# Patient Record
Sex: FEMALE | Race: WHITE | Employment: PART TIME | URBAN - METROPOLITAN AREA
[De-identification: names, ages, dates, MRNs, and addresses within clinical notes are randomized per-mention and may not be internally consistent; named-entity substitution may affect disease eponyms.]

---

## 2019-08-28 ENCOUNTER — APPOINTMENT (OUTPATIENT)
Dept: GENERAL RADIOLOGY | Age: 24
End: 2019-08-28

## 2019-08-28 ENCOUNTER — HOSPITAL ENCOUNTER (EMERGENCY)
Age: 24
Discharge: HOME | End: 2019-08-28

## 2019-08-28 VITALS
WEIGHT: 126.54 LBS | TEMPERATURE: 97.5 F | HEART RATE: 84 BPM | RESPIRATION RATE: 17 BRPM | BODY MASS INDEX: 21.08 KG/M2 | OXYGEN SATURATION: 99 % | SYSTOLIC BLOOD PRESSURE: 119 MMHG | DIASTOLIC BLOOD PRESSURE: 78 MMHG | HEIGHT: 65 IN

## 2019-08-28 DIAGNOSIS — S39.012A BACK STRAIN, INITIAL ENCOUNTER: ICD-10-CM

## 2019-08-28 DIAGNOSIS — S16.1XXA ACUTE STRAIN OF NECK MUSCLE, INITIAL ENCOUNTER: ICD-10-CM
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
## 2019-08-28 LAB — HCG(URINE) PREGNANCY TEST: NEGATIVE

## 2019-08-28 PROCEDURE — 6370000000 HC RX 637 (ALT 250 FOR IP): Performed by: PHYSICIAN ASSISTANT

## 2019-08-28 PROCEDURE — 99284 EMERGENCY DEPT VISIT MOD MDM: CPT

## 2019-08-28 PROCEDURE — 72100 X-RAY EXAM L-S SPINE 2/3 VWS: CPT

## 2019-08-28 PROCEDURE — 72040 X-RAY EXAM NECK SPINE 2-3 VW: CPT

## 2019-08-28 PROCEDURE — 84703 CHORIONIC GONADOTROPIN ASSAY: CPT

## 2019-08-28 RX ORDER — IBUPROFEN 400 MG/1
800 TABLET ORAL ONCE
Status: COMPLETED | OUTPATIENT
Start: 2019-08-28 | End: 2019-08-28

## 2019-08-28 RX ORDER — ACETAMINOPHEN 325 MG/1
650 TABLET ORAL ONCE
Status: COMPLETED | OUTPATIENT
Start: 2019-08-28 | End: 2019-08-28

## 2019-08-28 RX ORDER — CYCLOBENZAPRINE HCL 10 MG
10 TABLET ORAL 3 TIMES DAILY PRN
Qty: 21 TABLET | Refills: 0 | Status: SHIPPED | OUTPATIENT
Start: 2019-08-28 | End: 2019-09-07

## 2019-08-28 RX ORDER — IBUPROFEN 800 MG/1
800 TABLET ORAL EVERY 8 HOURS PRN
Qty: 30 TABLET | Refills: 0 | Status: SHIPPED | OUTPATIENT
Start: 2019-08-28

## 2019-08-28 RX ADMIN — ACETAMINOPHEN 650 MG: 325 TABLET ORAL at 10:26

## 2019-08-28 RX ADMIN — IBUPROFEN 800 MG: 400 TABLET, FILM COATED ORAL at 10:26

## 2019-08-28 ASSESSMENT — PAIN DESCRIPTION - ORIENTATION
ORIENTATION: MID

## 2019-08-28 ASSESSMENT — PAIN DESCRIPTION - FREQUENCY
FREQUENCY: CONTINUOUS

## 2019-08-28 ASSESSMENT — ENCOUNTER SYMPTOMS
ABDOMINAL PAIN: 0
VOMITING: 0
DIARRHEA: 0
COUGH: 0
BACK PAIN: 1
EYE PAIN: 0
NAUSEA: 0
SHORTNESS OF BREATH: 0

## 2019-08-28 ASSESSMENT — PAIN DESCRIPTION - PROGRESSION
CLINICAL_PROGRESSION: NOT CHANGED
CLINICAL_PROGRESSION: GRADUALLY WORSENING
CLINICAL_PROGRESSION: GRADUALLY IMPROVING

## 2019-08-28 ASSESSMENT — PAIN DESCRIPTION - ONSET
ONSET: ON-GOING

## 2019-08-28 ASSESSMENT — PAIN SCALES - GENERAL
PAINLEVEL_OUTOF10: 7
PAINLEVEL_OUTOF10: 6

## 2019-08-28 ASSESSMENT — PAIN DESCRIPTION - LOCATION
LOCATION: BACK

## 2019-08-28 ASSESSMENT — PAIN - FUNCTIONAL ASSESSMENT
PAIN_FUNCTIONAL_ASSESSMENT: ACTIVITIES ARE NOT PREVENTED
PAIN_FUNCTIONAL_ASSESSMENT: ACTIVITIES ARE NOT PREVENTED
PAIN_FUNCTIONAL_ASSESSMENT: PREVENTS OR INTERFERES SOME ACTIVE ACTIVITIES AND ADLS

## 2019-08-28 ASSESSMENT — PAIN DESCRIPTION - PAIN TYPE
TYPE: ACUTE PAIN

## 2019-08-28 ASSESSMENT — PAIN DESCRIPTION - DIRECTION: RADIATING_TOWARDS: RIGHT SIDE

## 2019-08-28 ASSESSMENT — PAIN DESCRIPTION - DESCRIPTORS
DESCRIPTORS: SORE
DESCRIPTORS: ACHING
DESCRIPTORS: ACHING;TIGHTNESS

## 2019-08-28 NOTE — ED PROVIDER NOTES
and vomiting. Genitourinary: Negative for dysuria. Musculoskeletal: Positive for back pain and neck pain. Negative for neck stiffness. Skin: Negative for rash. Neurological: Negative for dizziness and headaches. Psychiatric/Behavioral: Negative for confusion. Positives and Pertinent negatives as per HPI. Except as noted above in the ROS, problem specific ROS was completed and is negative. Physical Exam:  Physical Exam   Constitutional: She is oriented to person, place, and time. She appears well-developed. No distress. HENT:   Head: Normocephalic and atraumatic. Eyes: Right eye exhibits no discharge. Left eye exhibits no discharge. Neck: Normal range of motion. Neck supple. Pulmonary/Chest: No stridor. No respiratory distress. Musculoskeletal: Normal range of motion. Cervical back: She exhibits tenderness and bony tenderness. She exhibits normal range of motion. Lumbar back: She exhibits tenderness and pain. She exhibits normal range of motion, no bony tenderness, no swelling, no edema, no deformity, no laceration and normal pulse. Back:    BUE: strength intact 5/5, sensation intact bilaterally throughout, full ROM throughout  BLE: L4/L5/S1 strength intact 5/5, patellar reflexes +2, sensation intact, dorsalis pedis pulses +2   Neurological: She is alert and oriented to person, place, and time. No gross facial drooping. Moves all 4 extremities spontaneously. Skin: Skin is warm and dry. She is not diaphoretic. No pallor. Psychiatric: She has a normal mood and affect. Her behavior is normal.   Nursing note and vitals reviewed.       MEDICAL DECISION MAKING    Vitals:    Vitals:    08/28/19 1005 08/28/19 1102   BP: 129/82 116/80   Pulse: 86 82   Resp: 16 16   Temp: 97.6 °F (36.4 °C)    TempSrc: Oral    SpO2: 99%    Weight:  126 lb 8.7 oz (57.4 kg)   Height:  5' 5\" (1.651 m)       LABS:  Labs Reviewed   PREGNANCY, URINE    Narrative:     Performed at:  [facility]) - to edit the dictations but occasionally words are mis-transcribed.)    Electronically signed, Albaro Julien,           Albaro Julien  08/28/19 1150

## 2022-03-07 ENCOUNTER — OFFICE VISIT (OUTPATIENT)
Dept: INTERNAL MEDICINE CLINIC | Age: 27
End: 2022-03-07
Payer: COMMERCIAL

## 2022-03-07 VITALS
WEIGHT: 139 LBS | SYSTOLIC BLOOD PRESSURE: 100 MMHG | OXYGEN SATURATION: 99 % | BODY MASS INDEX: 23.13 KG/M2 | DIASTOLIC BLOOD PRESSURE: 78 MMHG | TEMPERATURE: 97.9 F | HEART RATE: 96 BPM

## 2022-03-07 DIAGNOSIS — Z00.00 HEALTHCARE MAINTENANCE: ICD-10-CM

## 2022-03-07 DIAGNOSIS — F41.8 DEPRESSION WITH ANXIETY: ICD-10-CM

## 2022-03-07 DIAGNOSIS — B00.2 ORAL HERPES: ICD-10-CM

## 2022-03-07 DIAGNOSIS — Z76.89 ENCOUNTER TO ESTABLISH CARE: Primary | ICD-10-CM

## 2022-03-07 DIAGNOSIS — G43.019 INTRACTABLE MIGRAINE WITHOUT AURA AND WITHOUT STATUS MIGRAINOSUS: ICD-10-CM

## 2022-03-07 DIAGNOSIS — D22.9 NEVUS: ICD-10-CM

## 2022-03-07 DIAGNOSIS — Z87.898 HISTORY OF SYNCOPE: ICD-10-CM

## 2022-03-07 DIAGNOSIS — H92.01 RIGHT EAR PAIN: ICD-10-CM

## 2022-03-07 DIAGNOSIS — R23.8 CHANGE OF SKIN COLOR: ICD-10-CM

## 2022-03-07 LAB
A/G RATIO: 1.6 (ref 1.1–2.2)
ALBUMIN SERPL-MCNC: 4.4 G/DL (ref 3.4–5)
ALP BLD-CCNC: 82 U/L (ref 40–129)
ALT SERPL-CCNC: 15 U/L (ref 10–40)
ANION GAP SERPL CALCULATED.3IONS-SCNC: 14 MMOL/L (ref 3–16)
AST SERPL-CCNC: 17 U/L (ref 15–37)
BASOPHILS ABSOLUTE: 0 K/UL (ref 0–0.2)
BASOPHILS RELATIVE PERCENT: 0.7 %
BILIRUB SERPL-MCNC: 0.5 MG/DL (ref 0–1)
BUN BLDV-MCNC: 10 MG/DL (ref 7–20)
CALCIUM SERPL-MCNC: 9.3 MG/DL (ref 8.3–10.6)
CHLORIDE BLD-SCNC: 102 MMOL/L (ref 99–110)
CO2: 21 MMOL/L (ref 21–32)
CREAT SERPL-MCNC: 0.8 MG/DL (ref 0.6–1.1)
EOSINOPHILS ABSOLUTE: 0.4 K/UL (ref 0–0.6)
EOSINOPHILS RELATIVE PERCENT: 6.6 %
GFR AFRICAN AMERICAN: >60
GFR NON-AFRICAN AMERICAN: >60
GLUCOSE BLD-MCNC: 94 MG/DL (ref 70–99)
HCT VFR BLD CALC: 39.3 % (ref 36–48)
HEMOGLOBIN: 13.4 G/DL (ref 12–16)
LYMPHOCYTES ABSOLUTE: 2.2 K/UL (ref 1–5.1)
LYMPHOCYTES RELATIVE PERCENT: 33.4 %
MCH RBC QN AUTO: 32.7 PG (ref 26–34)
MCHC RBC AUTO-ENTMCNC: 34.2 G/DL (ref 31–36)
MCV RBC AUTO: 95.7 FL (ref 80–100)
MONOCYTES ABSOLUTE: 0.6 K/UL (ref 0–1.3)
MONOCYTES RELATIVE PERCENT: 9.7 %
NEUTROPHILS ABSOLUTE: 3.3 K/UL (ref 1.7–7.7)
NEUTROPHILS RELATIVE PERCENT: 49.6 %
PDW BLD-RTO: 13 % (ref 12.4–15.4)
PLATELET # BLD: 223 K/UL (ref 135–450)
PMV BLD AUTO: 8.9 FL (ref 5–10.5)
POTASSIUM SERPL-SCNC: 4.1 MMOL/L (ref 3.5–5.1)
RBC # BLD: 4.11 M/UL (ref 4–5.2)
SODIUM BLD-SCNC: 137 MMOL/L (ref 136–145)
TOTAL PROTEIN: 7.2 G/DL (ref 6.4–8.2)
TSH REFLEX: 0.97 UIU/ML (ref 0.27–4.2)
VITAMIN D 25-HYDROXY: 25.9 NG/ML
WBC # BLD: 6.6 K/UL (ref 4–11)

## 2022-03-07 PROCEDURE — 99204 OFFICE O/P NEW MOD 45 MIN: CPT | Performed by: NURSE PRACTITIONER

## 2022-03-07 RX ORDER — VALACYCLOVIR HYDROCHLORIDE 1 G/1
2000 TABLET, FILM COATED ORAL 2 TIMES DAILY
Qty: 30 TABLET | Refills: 0 | Status: SHIPPED | OUTPATIENT
Start: 2022-03-07 | End: 2022-06-23 | Stop reason: SDUPTHER

## 2022-03-07 RX ORDER — TRAZODONE HYDROCHLORIDE 50 MG/1
50 TABLET ORAL NIGHTLY
Qty: 90 TABLET | Refills: 1 | Status: SHIPPED | OUTPATIENT
Start: 2022-03-07 | End: 2022-09-16 | Stop reason: SDUPTHER

## 2022-03-07 RX ORDER — BUPROPION HYDROCHLORIDE 300 MG/1
300 TABLET ORAL DAILY
COMMUNITY
End: 2022-03-07 | Stop reason: SDUPTHER

## 2022-03-07 RX ORDER — VALACYCLOVIR HYDROCHLORIDE 1 G/1
1000 TABLET, FILM COATED ORAL PRN
COMMUNITY
End: 2022-03-07 | Stop reason: SDUPTHER

## 2022-03-07 RX ORDER — ESCITALOPRAM OXALATE 10 MG/1
10 TABLET ORAL DAILY
COMMUNITY
End: 2022-03-07 | Stop reason: SDUPTHER

## 2022-03-07 RX ORDER — TOPIRAMATE 25 MG/1
TABLET ORAL
COMMUNITY
Start: 2022-01-16 | End: 2022-03-07 | Stop reason: SDUPTHER

## 2022-03-07 RX ORDER — TOPIRAMATE 25 MG/1
25 TABLET ORAL DAILY
Qty: 90 TABLET | Refills: 1 | Status: SHIPPED | OUTPATIENT
Start: 2022-03-07 | End: 2022-09-16 | Stop reason: SDUPTHER

## 2022-03-07 RX ORDER — BUPROPION HYDROCHLORIDE 300 MG/1
300 TABLET ORAL DAILY
Qty: 90 TABLET | Refills: 1 | Status: SHIPPED | OUTPATIENT
Start: 2022-03-07 | End: 2022-09-16 | Stop reason: SDUPTHER

## 2022-03-07 RX ORDER — TRAZODONE HYDROCHLORIDE 50 MG/1
25 TABLET ORAL NIGHTLY
COMMUNITY
End: 2022-03-07 | Stop reason: SDUPTHER

## 2022-03-07 RX ORDER — ESCITALOPRAM OXALATE 10 MG/1
10 TABLET ORAL DAILY
Qty: 90 TABLET | Refills: 1 | Status: SHIPPED | OUTPATIENT
Start: 2022-03-07 | End: 2022-09-16 | Stop reason: SDUPTHER

## 2022-03-07 ASSESSMENT — ENCOUNTER SYMPTOMS
CONSTIPATION: 0
COUGH: 0
SHORTNESS OF BREATH: 0
DIARRHEA: 0
NAUSEA: 0
COLOR CHANGE: 1
GASTROINTESTINAL NEGATIVE: 1
VOMITING: 0

## 2022-03-07 ASSESSMENT — PATIENT HEALTH QUESTIONNAIRE - PHQ9
SUM OF ALL RESPONSES TO PHQ QUESTIONS 1-9: 0
SUM OF ALL RESPONSES TO PHQ QUESTIONS 1-9: 0
2. FEELING DOWN, DEPRESSED OR HOPELESS: 0
SUM OF ALL RESPONSES TO PHQ QUESTIONS 1-9: 0
SUM OF ALL RESPONSES TO PHQ QUESTIONS 1-9: 0
SUM OF ALL RESPONSES TO PHQ9 QUESTIONS 1 & 2: 0
1. LITTLE INTEREST OR PLEASURE IN DOING THINGS: 0

## 2022-03-07 NOTE — PROGRESS NOTES
Date: 3/7/2022                                               Subjective/Objective:     Chief Complaint   Patient presents with   [address]       HPI     Christos Room is a 22 yo female, visit today to establish care. Was receiving care at [facility], last seen about a year ago. Had sinus infection a few months ago, was having a lot of ear pain also. Was told at urgent care that her ears were really inflamed. Was treated with antibiotics and steroids. Since that time she has had intermittent pain in her right ear. No drainage. Pain seems to be worse when her allergies are worse. Uses Flonase as needed for head congestion. Takes OTC allergy medication daily. Has bruise on right buttocks. Has been there for one year - fluctuates in size but never resolves. Not tender. History of migraine headaches followed by neurology at [facility]. She has managed on Topamax daily. She reports that since starting Topamax her headaches have decreased in severity. She was in she was also having syncopal episodes prior to starting Topamax. Neurology and cardiology have felt that these are related to her migraines, she has not had any episode since starting Topamax. History of anxiety/depression managed on Wellbutrin, Lexapro and trazodone as needed nightly. She reports that she has been treated for about 2 to 3 years. She reports that she previously saw a therapist however on current medication regimen has been feeling well. She feels her mood has been doing well. She denies SI currently. Denies prior SI. Vaccinations: Katelin Flaquita per patient, COVID-has received two doses, HPV-unsure, Tdap-UTD  Gyn: BCM: Nexplanon (placed 2 years ago). 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    Exercise: goes to the gym 3 times per week - cardio and strength training                    Patient Active Problem List    Diagnosis Date Noted    Depression with anxiety 03/07/2022    Oral herpes 03/07/2022    Intractable migraine without aura and without status migrainosus 03/07/2022       Past Medical History:   Diagnosis Date    Anxiety     Depression     Headache     Oral herpes        Past Surgical History:   Procedure Laterality Date    WISDOM TOOTH EXTRACTION      age 14       Admission on 08/28/2019, Discharged on 08/28/2019   Component Date Value Ref Range Status    HCG(Urine) Pregnancy Test 08/28/2019 Negative  Detects HCG level >20 MIU/mL Final    Comment: Note:  Always repeat results in question with a serum  quantitative pregnancy test. A serum hCG is positive  2-5 days before the urine hCG test.         Family History   Problem Relation Age of Onset    Heart Disease Mother     Anxiety Disorder Mother     Depression Mother     Anxiety Disorder Father     Depression Father     Breast Cancer Maternal Grandmother     Kidney Cancer Maternal Grandfather        Current Outpatient Medications   Medication Sig Dispense Refill    fluticasone propionate (FLOVENT) 50 MCG/BLIST AEPB inhaler Inhale 1 puff into the lungs daily      etonogestrel (NEXPLANON) 68 MG implant 68 mg by Subdermal route once      valACYclovir (VALTREX) 1 g tablet Take 2 tablets by mouth 2 times daily 30 tablet 0    buPROPion (WELLBUTRIN XL) 300 MG extended release tablet Take 1 tablet by mouth daily 90 tablet 1    escitalopram (LEXAPRO) 10 MG tablet Take 1 tablet by mouth daily 90 tablet 1    topiramate (TOPAMAX) 25 MG tablet Take 1 tablet by mouth daily 90 tablet 1    traZODone (DESYREL) 50 MG tablet Take 1 tablet by mouth nightly 90 tablet 1    ibuprofen (ADVIL;MOTRIN) 800 MG tablet Take 1 tablet by mouth every 8 hours as needed for Pain 30 tablet 0     No current facility-administered medications for this visit. No Known Allergies    Review of Systems   Constitutional: Negative for chills and fever.    Respiratory: Negative for cough and shortness of breath. Cardiovascular: Negative for chest pain. Gastrointestinal: Negative. Negative for constipation, diarrhea, nausea and vomiting. Musculoskeletal: Negative for arthralgias and myalgias. Skin: Positive for color change. Left upper buttocks bruising   Neurological: Positive for headaches. Negative for dizziness and syncope. Psychiatric/Behavioral: Negative for dysphoric mood and sleep disturbance. The patient is not nervous/anxious. Vitals:  /78 (Site: Left Upper Arm, Position: Sitting, Cuff Size: Medium Adult)   Pulse 96   Temp 97.9 °F (36.6 °C) (Oral)   Wt 139 lb (63 kg)   SpO2 99%   BMI 23.13 kg/m²     Physical Exam  Vitals reviewed. Constitutional:       General: She is not in acute distress. Appearance: Normal appearance. HENT:      Head: Normocephalic and atraumatic. Right Ear: Tympanic membrane, ear canal and external ear normal.      Left Ear: Tympanic membrane, ear canal and external ear normal.   Cardiovascular:      Rate and Rhythm: Normal rate and regular rhythm. Heart sounds: Normal heart sounds. No murmur heard. Pulmonary:      Effort: Pulmonary effort is normal. No respiratory distress. Breath sounds: Normal breath sounds. No wheezing. Abdominal:      General: Bowel sounds are normal. There is no distension. Palpations: Abdomen is soft. Tenderness: There is no abdominal tenderness. Musculoskeletal:      Right lower leg: No edema. Left lower leg: No edema. Skin:     General: Skin is warm and dry. Comments: Right buttock hyperpigmented papule  Right neck nevi   Neurological:      General: No focal deficit present. Mental Status: She is alert and oriented to person, place, and time. Psychiatric:         Mood and Affect: Mood normal.         Behavior: Behavior normal.         Thought Content: Thought content normal.         Judgment: Judgment normal.         Assessment/Plan     1. Encounter to establish care    2. Depression with anxiety: stable  - buPROPion (WELLBUTRIN XL) 300 MG extended release tablet; Take 1 tablet by mouth daily  Dispense: 90 tablet; Refill: 1  - escitalopram (LEXAPRO) 10 MG tablet; Take 1 tablet by mouth daily  Dispense: 90 tablet; Refill: 1  - traZODone (DESYREL) 50 MG tablet; Take 1 tablet by mouth nightly  Dispense: 90 tablet; Refill: 1   -Continue current medications. Follow-up 6 months, sooner if needed. 3. Oral herpes  - valACYclovir (VALTREX) 1 g tablet; Take 2 tablets by mouth 2 times daily  Dispense: 30 tablet; Refill: 0   -Episodic treatment. Refill prescription. 4. Intractable migraine without aura and without status migrainosus: stable  - topiramate (TOPAMAX) 25 MG tablet; Take 1 tablet by mouth daily  Dispense: 90 tablet; Refill: 1   -Had change of insurance and is working on following up with neurology. 5. History of syncope: Related to migraines. Has not had any episode since starting Topamax. Had change of insurance and is working on following up with neurology. 6. Right ear pain: Exam unremarkable. Symptoms do coincide with increase in her allergic rhinitis symptoms.  - Mojgan Gomez MD, Otolaryngology, [facility]   -Patient advised to use Flonase daily rather than as needed. Continue with OTC antihistamine. Follow-up with ENT if symptoms do not resolve. 7. Nevus  - External Referral To Dermatology    8. Change of skin color  - External Referral To Dermatology    9. Healthcare maintenance  -She is unsure if she has received HPV vaccination, will check  -Continue regular exercise  - Comprehensive Metabolic Panel  - CBC with Auto Differential  - TSH with Reflex; Future  - Vitamin D 25 Hydroxy;  Future  - [facility] - Kirstin Sanchez MD, Gynecology, [facility]      Orders Placed This Encounter   Procedures    Comprehensive Metabolic Panel    CBC with Auto Differential    TSH with Reflex     Standing Status: Future     Number of Occurrences:   1     Standing Expiration Date:   3/7/2023    Vitamin D 25 Hydroxy     Standing Status:   Future     Number of Occurrences:   1     Standing Expiration Date:   3/7/2023    NINO Pierre MD, Gynecology, [facility]     Referral Priority:   Routine     Referral Type:   Eval and Treat     Referral Reason:   Specialty Services Required     Referred to Provider:   Denisse Rose MD     Requested Specialty:   Obstetrics & Gynecology     Number of Visits Requested:   Martin Hsieh MD, Otolaryngology, [facility]     Referral Priority:   Routine     Referral Type:   Eval and Treat     Referral Reason:   Specialty Services Required     Referred to Provider:   Dino Garcia MD     Requested Specialty:   Otolaryngology     Number of Visits Requested:   1    External Referral To Dermatology     Referral Priority:   Routine     Referral Type:   Eval and Treat     Referral Reason:   Specialty Services Required     Requested Specialty:   Dermatology     Number of Visits Requested:   1       Return in about 6 months (around 9/7/2022) for anxiety/depression. OR sooner with questions, concerns, worsening symptoms    KAYA KEYS, NP    3/7/2022  2:45 PM    Discussed use, benefit, and side effects of prescribed medications. Barriers to medication compliance addressed. Discussed all ordered testing and labs. All patient questions answered. Patient agreeable with plan above. Please note that this chart was generated using dragon dictation software. Although every effort was made to ensure the accuracy of this automated transcription, some errors in transcription may have occurred.

## 2022-06-23 DIAGNOSIS — B00.2 ORAL HERPES: ICD-10-CM

## 2022-06-24 RX ORDER — VALACYCLOVIR HYDROCHLORIDE 1 G/1
2000 TABLET, FILM COATED ORAL 2 TIMES DAILY
Qty: 30 TABLET | Refills: 0 | Status: SHIPPED | OUTPATIENT
Start: 2022-06-24

## 2022-09-16 ENCOUNTER — OFFICE VISIT (OUTPATIENT)
Dept: INTERNAL MEDICINE CLINIC | Age: 27
End: 2022-09-16
Payer: COMMERCIAL

## 2022-09-16 VITALS
DIASTOLIC BLOOD PRESSURE: 76 MMHG | OXYGEN SATURATION: 100 % | SYSTOLIC BLOOD PRESSURE: 110 MMHG | HEART RATE: 86 BPM | TEMPERATURE: 98.5 F | WEIGHT: 151 LBS | BODY MASS INDEX: 25.13 KG/M2

## 2022-09-16 DIAGNOSIS — F41.8 DEPRESSION WITH ANXIETY: Primary | ICD-10-CM

## 2022-09-16 DIAGNOSIS — R15.2 FECAL URGENCY: ICD-10-CM

## 2022-09-16 DIAGNOSIS — R53.83 FATIGUE, UNSPECIFIED TYPE: ICD-10-CM

## 2022-09-16 DIAGNOSIS — G43.019 INTRACTABLE MIGRAINE WITHOUT AURA AND WITHOUT STATUS MIGRAINOSUS: ICD-10-CM

## 2022-09-16 DIAGNOSIS — K92.1 BLOOD IN STOOL: ICD-10-CM

## 2022-09-16 PROCEDURE — 36415 COLL VENOUS BLD VENIPUNCTURE: CPT | Performed by: NURSE PRACTITIONER

## 2022-09-16 PROCEDURE — 99214 OFFICE O/P EST MOD 30 MIN: CPT | Performed by: NURSE PRACTITIONER

## 2022-09-16 RX ORDER — TRAZODONE HYDROCHLORIDE 50 MG/1
50 TABLET ORAL NIGHTLY
Qty: 90 TABLET | Refills: 1 | Status: SHIPPED | OUTPATIENT
Start: 2022-09-16

## 2022-09-16 RX ORDER — TOPIRAMATE 25 MG/1
25 TABLET ORAL DAILY
Qty: 90 TABLET | Refills: 1 | Status: SHIPPED | OUTPATIENT
Start: 2022-09-16

## 2022-09-16 RX ORDER — ESCITALOPRAM OXALATE 10 MG/1
10 TABLET ORAL DAILY
Qty: 90 TABLET | Refills: 1 | Status: SHIPPED | OUTPATIENT
Start: 2022-09-16

## 2022-09-16 RX ORDER — BUPROPION HYDROCHLORIDE 300 MG/1
300 TABLET ORAL DAILY
Qty: 90 TABLET | Refills: 1 | Status: SHIPPED | OUTPATIENT
Start: 2022-09-16

## 2022-09-16 ASSESSMENT — ENCOUNTER SYMPTOMS
NAUSEA: 0
BLOOD IN STOOL: 1
VOMITING: 1
COUGH: 0
DIARRHEA: 1
ABDOMINAL PAIN: 1
SHORTNESS OF BREATH: 0

## 2022-09-16 NOTE — PROGRESS NOTES
Date: 9/16/2022                                               Subjective/Objective:     Chief Complaint   Patient presents with    Depression     6 month follow up    Abdominal Pain     Has been having right sided abdominal pain, bowel issues/ urgency. Has some left sided abdominal pain as well       HPI    Nicole Harris is a 22 yo female, visit today for follow up on depression and anxiety. Last seen 3/7/2022. Recently moved to [place], [place]. She complains of urgency with bowel movements that began several months ago. This would occur about once per week and she would have loose stools associated with this. Over the last month this is increased to happening several times per week. She describes this as feeling abdominal pressure, having urgency like if she does not get to the bathroom right away she will make it and having associated loose stools. Having a bowel movement does not always completely relieve the abdominal pressure. She has had some intermittent abdominal bloating not associated with these episodes. This morning the abdominal pressure so severe she vomited, this is never happened before. She describes a clear liquid emesis. She does note that she had 2 episodes of blood on the toilet paper and in the stool. She denies fever/chills. She feels like she has been eating healthier lately. She denies any fecal incontinence. She is not having bowel movements outside of when she has these episodes. She reports that previously she folic she had always been constipated, was having a bowel movement once weekly that was formed, this was normal for her. Reports her brother and father have both had GI issues however she is unsure what. Over last several months has had fatigue. Starts about two hours after she wakes up she starts feeling tired and continues during the day. Had previously been started on wellbutrin due to feeling fatigued after taking trazodone and this had improved.  She feels like she is sleeping well at night. History of migraine headaches followed by neurology at [facility]. She has managed on Topamax daily. She reports that since starting Topamax her headaches have decreased in severity. She was in she was also having syncopal episodes prior to starting Topamax. Neurology and cardiology have felt that these are related to her migraines, she has not had any episode since starting Topamax. History of anxiety/depression managed on Wellbutrin, Lexapro and trazodone as needed nightly. Feels this medications have been very effective. Denies SI. Denies medication side effects. 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 - cardio and strength training            Patient Active Problem List    Diagnosis Date Noted    Depression with anxiety 03/07/2022    Oral herpes 03/07/2022    Intractable migraine without aura and without status migrainosus 03/07/2022       Past Medical History:   Diagnosis Date    Anxiety     Depression     Headache     Oral herpes        Past Surgical History:   Procedure Laterality Date    WISDOM TOOTH EXTRACTION      age 14       Office Visit on 03/07/2022   Component Date Value Ref Range Status    Sodium 03/07/2022 137  136 - 145 mmol/L Final    Potassium 03/07/2022 4.1  3.5 - 5.1 mmol/L Final    Chloride 03/07/2022 102  99 - 110 mmol/L Final    CO2 03/07/2022 21  21 - 32 mmol/L Final    Anion Gap 03/07/2022 14  3 - 16 Final    Glucose 03/07/2022 94  70 - 99 mg/dL Final    BUN 03/07/2022 10  7 - 20 mg/dL Final    Creatinine 03/07/2022 0.8  0.6 - 1.1 mg/dL Final    GFR Non- 03/07/2022 >60  >60 Final    Comment: >60 mL/min/1.73m2 EGFR, calc. for ages 25 and older using the  MDRD formula (not corrected for weight), is valid for stable  renal function.       GFR  03/07/2022 >60  >60 Final    Comment: Chronic Kidney Disease: less than 60 ml/min/1.73 sq.m. Kidney Failure: less than 15 ml/min/1.73 sq.m. Results valid for patients 18 years and older. Calcium 03/07/2022 9.3  8.3 - 10.6 mg/dL Final    Total Protein 03/07/2022 7.2  6.4 - 8.2 g/dL Final    Albumin 03/07/2022 4.4  3.4 - 5.0 g/dL Final    Albumin/Globulin Ratio 03/07/2022 1.6  1.1 - 2.2 Final    Total Bilirubin 03/07/2022 0.5  0.0 - 1.0 mg/dL Final    Alkaline Phosphatase 03/07/2022 82  40 - 129 U/L Final    ALT 03/07/2022 15  10 - 40 U/L Final    AST 03/07/2022 17  15 - 37 U/L Final    WBC 03/07/2022 6.6  4.0 - 11.0 K/uL Final    RBC 03/07/2022 4.11  4.00 - 5.20 M/uL Final    Hemoglobin 03/07/2022 13.4  12.0 - 16.0 g/dL Final    Hematocrit 03/07/2022 39.3  36.0 - 48.0 % Final    MCV 03/07/2022 95.7  80.0 - 100.0 fL Final    MCH 03/07/2022 32.7  26.0 - 34.0 pg Final    MCHC 03/07/2022 34.2  31.0 - 36.0 g/dL Final    RDW 03/07/2022 13.0  12.4 - 15.4 % Final    Platelets 84/74/8750 223  135 - 450 K/uL Final    MPV 03/07/2022 8.9  5.0 - 10.5 fL Final    Neutrophils % 03/07/2022 49.6  % Final    Lymphocytes % 03/07/2022 33.4  % Final    Monocytes % 03/07/2022 9.7  % Final    Eosinophils % 03/07/2022 6.6  % Final    Basophils % 03/07/2022 0.7  % Final    Neutrophils Absolute 03/07/2022 3.3  1.7 - 7.7 K/uL Final    Lymphocytes Absolute 03/07/2022 2.2  1.0 - 5.1 K/uL Final    Monocytes Absolute 03/07/2022 0.6  0.0 - 1.3 K/uL Final    Eosinophils Absolute 03/07/2022 0.4  0.0 - 0.6 K/uL Final    Basophils Absolute 03/07/2022 0.0  0.0 - 0.2 K/uL Final    TSH 03/07/2022 0.97  0.27 - 4.20 uIU/mL Final    Vit D, 25-Hydroxy 03/07/2022 25.9 (A) >=30 ng/mL Final    Comment: <=20 ng/mL. ........... Sugey Raddle Deficient  21-29 ng/mL. ......... Sugey Raddle Insufficient  >=30 ng/mL. ........ Sugey Raddle Sufficient         Family History   Problem Relation Age of Onset    Heart Disease Mother     Anxiety Disorder Mother     Depression Mother     Anxiety Disorder Father Depression Father     Breast Cancer Maternal Grandmother     Kidney Cancer Maternal Grandfather        Current Outpatient Medications   Medication Sig Dispense Refill    Fluticasone Propionate (FLONASE NA) by Nasal route      buPROPion (WELLBUTRIN XL) 300 MG extended release tablet Take 1 tablet by mouth daily 90 tablet 1    escitalopram (LEXAPRO) 10 MG tablet Take 1 tablet by mouth daily 90 tablet 1    topiramate (TOPAMAX) 25 MG tablet Take 1 tablet by mouth daily 90 tablet 1    traZODone (DESYREL) 50 MG tablet Take 1 tablet by mouth nightly 90 tablet 1    valACYclovir (VALTREX) 1 g tablet Take 2 tablets by mouth 2 times daily 30 tablet 0    etonogestrel (NEXPLANON) 68 MG implant 68 mg by Subdermal route once      ibuprofen (ADVIL;MOTRIN) 800 MG tablet Take 1 tablet by mouth every 8 hours as needed for Pain 30 tablet 0     No current facility-administered medications for this visit. No Known Allergies    Review of Systems   Constitutional:  Negative for appetite change, chills, fever and unexpected weight change. Respiratory:  Negative for cough and shortness of breath. Gastrointestinal:  Positive for abdominal pain, blood in stool, diarrhea and vomiting. Negative for nausea. Genitourinary:  Negative for difficulty urinating. Psychiatric/Behavioral:  Negative for dysphoric mood and sleep disturbance. The patient is not nervous/anxious. Vitals:  /76 (Site: Left Upper Arm, Position: Sitting, Cuff Size: Medium Adult)   Pulse 86   Temp 98.5 °F (36.9 °C) (Oral)   Wt 151 lb (68.5 kg)   SpO2 100%   BMI 25.13 kg/m²     Physical Exam  Vitals reviewed. Constitutional:       General: She is not in acute distress. HENT:      Head: Normocephalic and atraumatic. Cardiovascular:      Rate and Rhythm: Normal rate and regular rhythm. Heart sounds: Normal heart sounds. Pulmonary:      Effort: Pulmonary effort is normal. No respiratory distress. Breath sounds: Normal breath sounds. No wheezing. Abdominal:      General: Bowel sounds are normal. There is no distension. Palpations: Abdomen is soft. There is no mass. Tenderness: There is no abdominal tenderness. There is no guarding. Skin:     General: Skin is warm and dry. Neurological:      General: No focal deficit present. Mental Status: She is alert and oriented to person, place, and time. Psychiatric:         Mood and Affect: Mood normal.         Behavior: Behavior normal.         Thought Content: Thought content normal.         Judgment: Judgment normal.       Assessment/Plan     1. Depression with anxiety: stable  - buPROPion (WELLBUTRIN XL) 300 MG extended release tablet; Take 1 tablet by mouth daily  Dispense: 90 tablet; Refill: 1  - escitalopram (LEXAPRO) 10 MG tablet; Take 1 tablet by mouth daily  Dispense: 90 tablet; Refill: 1  - traZODone (DESYREL) 50 MG tablet; Take 1 tablet by mouth nightly  Dispense: 90 tablet; Refill: 1   -Continue with current management Lexapro, Wellbutrin and trazodone   -Follow-up 6 months, sooner if need    2. Fecal urgency: With associated abdominal pressure. Abdominal exam unremarkable. - Amb External Referral To Gastroenterology  - CT ABDOMEN PELVIS WO CONTRAST Additional Contrast? Radiologist Recommendation; Future   -Obtain CT and refer to GI. She wishes to see GI closer to home, external referral placed. 3. Fatigue, unspecified type: Had previously responded to Wellbutrin however fatigue has been worsening over last several months. We will check labs as below.  - TSH with Reflex; Future  - Vitamin B12; Future  - Basic Metabolic Panel; Future  - CBC with Auto Differential; Future    4. Blood in stool: Suspect hemorrhoidal.  Refer to GI  - Amb External Referral To Gastroenterology    5. Intractable migraine without aura and without status migrainosus: Stable. - topiramate (TOPAMAX) 25 MG tablet; Take 1 tablet by mouth daily  Dispense: 90 tablet;  Refill: 1   -Continue current management with Topamax    Orders Placed This Encounter   Procedures    CT ABDOMEN PELVIS WO CONTRAST Additional Contrast? Radiologist Recommendation     Standing Status:   Future     Standing Expiration Date:   9/16/2023     Order Specific Question:   Additional Contrast?     Answer:   Radiologist Recommendation     Order Specific Question:   Reason for exam:     Answer:   fecal urgency with abdominal pain    TSH with Reflex     Standing Status:   Future     Number of Occurrences:   1     Standing Expiration Date:   9/16/2023    Vitamin B12     Standing Status:   Future     Number of Occurrences:   1     Standing Expiration Date:   5/30/1169    Basic Metabolic Panel     Standing Status:   Future     Number of Occurrences:   1     Standing Expiration Date:   9/16/2023    CBC with Auto Differential     Standing Status:   Future     Number of Occurrences:   1     Standing Expiration Date:   9/16/2023    Amb External Referral To Gastroenterology     Referral Priority:   Routine     Referral Type:   Eval and Treat     Referral Reason:   Specialty Services Required     Requested Specialty:   Gastroenterology     Number of Visits Requested:   1       Return in about 6 months (around 3/16/2023). OR sooner with questions, concerns, worsening symptoms    KAYA KEYS  9/16/2022  3:43 PM    Discussed use, benefit, and side effects of prescribed medications. Barriers to medication compliance addressed. Discussed all ordered testing and labs. All patient questions answered. Patient agreeable with plan above. Please note that this chart was generated using dragon dictation software. Although every effort was made to ensure the accuracy of this automated transcription, some errors in transcription may have occurred.

## 2022-09-17 LAB
ANION GAP SERPL CALCULATED.3IONS-SCNC: 9 MMOL/L (ref 3–16)
BASOPHILS ABSOLUTE: 0.1 K/UL (ref 0–0.2)
BASOPHILS RELATIVE PERCENT: 0.9 %
BUN BLDV-MCNC: 12 MG/DL (ref 7–20)
CALCIUM SERPL-MCNC: 9.1 MG/DL (ref 8.3–10.6)
CHLORIDE BLD-SCNC: 105 MMOL/L (ref 99–110)
CO2: 26 MMOL/L (ref 21–32)
CREAT SERPL-MCNC: 0.7 MG/DL (ref 0.6–1.1)
EOSINOPHILS ABSOLUTE: 0.1 K/UL (ref 0–0.6)
EOSINOPHILS RELATIVE PERCENT: 2.1 %
GFR AFRICAN AMERICAN: >60
GFR NON-AFRICAN AMERICAN: >60
GLUCOSE BLD-MCNC: 90 MG/DL (ref 70–99)
HCT VFR BLD CALC: 37.7 % (ref 36–48)
HEMOGLOBIN: 12.9 G/DL (ref 12–16)
LYMPHOCYTES ABSOLUTE: 2.3 K/UL (ref 1–5.1)
LYMPHOCYTES RELATIVE PERCENT: 34.3 %
MCH RBC QN AUTO: 32.4 PG (ref 26–34)
MCHC RBC AUTO-ENTMCNC: 34.2 G/DL (ref 31–36)
MCV RBC AUTO: 94.8 FL (ref 80–100)
MONOCYTES ABSOLUTE: 0.6 K/UL (ref 0–1.3)
MONOCYTES RELATIVE PERCENT: 9.7 %
NEUTROPHILS ABSOLUTE: 3.5 K/UL (ref 1.7–7.7)
NEUTROPHILS RELATIVE PERCENT: 53 %
PDW BLD-RTO: 12.6 % (ref 12.4–15.4)
PLATELET # BLD: 251 K/UL (ref 135–450)
PMV BLD AUTO: 9.1 FL (ref 5–10.5)
POTASSIUM SERPL-SCNC: 4 MMOL/L (ref 3.5–5.1)
RBC # BLD: 3.98 M/UL (ref 4–5.2)
SODIUM BLD-SCNC: 140 MMOL/L (ref 136–145)
TSH REFLEX: 0.52 UIU/ML (ref 0.27–4.2)
VITAMIN B-12: 477 PG/ML (ref 211–911)
WBC # BLD: 6.7 K/UL (ref 4–11)

## 2022-09-26 DIAGNOSIS — Z11.8 SCREENING FOR CHLAMYDIAL DISEASE: Primary | ICD-10-CM

## 2023-01-14 DIAGNOSIS — B00.2 ORAL HERPES: ICD-10-CM

## 2023-01-16 RX ORDER — VALACYCLOVIR HYDROCHLORIDE 1 G/1
2000 TABLET, FILM COATED ORAL 2 TIMES DAILY
Qty: 28 TABLET | Refills: 0 | Status: SHIPPED | OUTPATIENT
Start: 2023-01-16